# Patient Record
Sex: FEMALE | Race: WHITE | NOT HISPANIC OR LATINO | Employment: FULL TIME | URBAN - METROPOLITAN AREA
[De-identification: names, ages, dates, MRNs, and addresses within clinical notes are randomized per-mention and may not be internally consistent; named-entity substitution may affect disease eponyms.]

---

## 2016-07-06 LAB — PAP SMEAR: NORMAL

## 2017-03-07 DIAGNOSIS — B00.1 FEVER BLISTER: ICD-10-CM

## 2017-03-07 RX ORDER — ACYCLOVIR 50 MG/G
CREAM TOPICAL
Qty: 5 G | Refills: 3 | Status: SHIPPED | OUTPATIENT
Start: 2017-03-07 | End: 2018-03-09 | Stop reason: SDUPTHER

## 2017-03-07 NOTE — TELEPHONE ENCOUNTER
----- Message from Wendy Mccord sent at 3/7/2017 11:53 AM CST -----  Contact: self  Patient wants to speak with a nurse regarding a refill on fever blister medication. Please call back at [phone]

## 2017-11-01 ENCOUNTER — OFFICE VISIT (OUTPATIENT)
Dept: FAMILY MEDICINE | Facility: CLINIC | Age: 52
End: 2017-11-01
Payer: COMMERCIAL

## 2017-11-01 VITALS
HEART RATE: 81 BPM | TEMPERATURE: 98 F | RESPIRATION RATE: 18 BRPM | SYSTOLIC BLOOD PRESSURE: 131 MMHG | DIASTOLIC BLOOD PRESSURE: 77 MMHG | WEIGHT: 181.88 LBS | OXYGEN SATURATION: 98 % | BODY MASS INDEX: 32.23 KG/M2 | HEIGHT: 63 IN

## 2017-11-01 DIAGNOSIS — J32.0 MAXILLARY SINUSITIS, UNSPECIFIED CHRONICITY: Primary | ICD-10-CM

## 2017-11-01 DIAGNOSIS — R06.02 SOB (SHORTNESS OF BREATH): ICD-10-CM

## 2017-11-01 PROCEDURE — 99999 PR PBB SHADOW E&M-EST. PATIENT-LVL III: CPT | Mod: PBBFAC,,, | Performed by: FAMILY MEDICINE

## 2017-11-01 PROCEDURE — 99214 OFFICE O/P EST MOD 30 MIN: CPT | Mod: 25,S$GLB,, | Performed by: FAMILY MEDICINE

## 2017-11-01 PROCEDURE — 96372 THER/PROPH/DIAG INJ SC/IM: CPT | Mod: S$GLB,,, | Performed by: FAMILY MEDICINE

## 2017-11-01 RX ORDER — AMOXICILLIN AND CLAVULANATE POTASSIUM 875; 125 MG/1; MG/1
1 TABLET, FILM COATED ORAL 2 TIMES DAILY
Qty: 20 TABLET | Refills: 0 | Status: SHIPPED | OUTPATIENT
Start: 2017-11-01 | End: 2017-11-11

## 2017-11-01 RX ORDER — BETAMETHASONE SODIUM PHOSPHATE AND BETAMETHASONE ACETATE 3; 3 MG/ML; MG/ML
9 INJECTION, SUSPENSION INTRA-ARTICULAR; INTRALESIONAL; INTRAMUSCULAR; SOFT TISSUE
Status: COMPLETED | OUTPATIENT
Start: 2017-11-01 | End: 2017-11-01

## 2017-11-01 RX ORDER — ALBUTEROL SULFATE 90 UG/1
2 AEROSOL, METERED RESPIRATORY (INHALATION) 4 TIMES DAILY
Qty: 1 INHALER | Refills: 1 | Status: SHIPPED | OUTPATIENT
Start: 2017-11-01 | End: 2019-03-04 | Stop reason: SDUPTHER

## 2017-11-01 RX ADMIN — BETAMETHASONE SODIUM PHOSPHATE AND BETAMETHASONE ACETATE 9 MG: 3; 3 INJECTION, SUSPENSION INTRA-ARTICULAR; INTRALESIONAL; INTRAMUSCULAR; SOFT TISSUE at 10:11

## 2017-11-01 NOTE — NURSING
2 Patient identifiers used (name & ). Administered 9 mg Celestone IM. Patient tolerated well. No bleeding at insertion site noted. Pain scale 0/10. Allergies reviewed. Aseptic technique maintained.

## 2017-11-01 NOTE — PROGRESS NOTES
Subjective:       Patient ID: Candie Elaine is a 52 y.o. female.    Chief Complaint: URI    Cough   This is a new problem. The current episode started in the past 7 days. The problem has been gradually worsening. The problem occurs hourly. The cough is non-productive. Associated symptoms include ear congestion, nasal congestion, postnasal drip, shortness of breath and wheezing. Pertinent negatives include no chest pain, fever or rash. Associated symptoms comments: Sinus painful.     Review of Systems   Constitutional: Negative for fever.   HENT: Positive for postnasal drip.    Respiratory: Positive for cough, shortness of breath and wheezing.    Cardiovascular: Negative for chest pain.   Gastrointestinal: Negative for abdominal pain and nausea.   Skin: Negative for rash.   All other systems reviewed and are negative.      Objective:      Physical Exam   Constitutional: She appears well-developed. No distress.   HENT:   Right Ear: Tympanic membrane is not erythematous.   Left Ear: Tympanic membrane is not erythematous.   Nose: Mucosal edema present. Right sinus exhibits maxillary sinus tenderness. Left sinus exhibits maxillary sinus tenderness.   Mouth/Throat: Posterior oropharyngeal erythema present.   Neck: Neck supple.   Cardiovascular: Normal rate and regular rhythm.    No murmur heard.  Pulmonary/Chest: Effort normal and breath sounds normal.   Lymphadenopathy:     She has no cervical adenopathy.       Assessment:       1. Maxillary sinusitis, unspecified chronicity    2. SOB (shortness of breath)        Plan:         Candie was seen today for uri.    Diagnoses and all orders for this visit:    Maxillary sinusitis, unspecified chronicity    SOB (shortness of breath)    Other orders  -     betamethasone acetate-betamethasone sodium phosphate injection 9 mg; Inject 1.5 mLs (9 mg total) into the muscle one time.  -     albuterol 90 mcg/actuation inhaler; Inhale 2 puffs into the lungs 4 (four) times daily.  -      amoxicillin-clavulanate 875-125mg (AUGMENTIN) 875-125 mg per tablet; Take 1 tablet by mouth 2 (two) times daily. Take after meals.

## 2018-03-09 DIAGNOSIS — B00.1 FEVER BLISTER: ICD-10-CM

## 2018-03-09 RX ORDER — ACYCLOVIR 50 MG/G
CREAM TOPICAL
Qty: 5 G | Refills: 0 | Status: SHIPPED | OUTPATIENT
Start: 2018-03-09 | End: 2018-03-13 | Stop reason: SDUPTHER

## 2018-03-09 NOTE — TELEPHONE ENCOUNTER
----- Message from Ruthie Andre sent at 3/9/2018  3:22 PM CST -----  Contact: self  Type:  RX Refill Request    Who Called:  self  Refill or New Rx:  refill  RX Name and Strength: acyclovir 5% (ZOVIRAX) 5 % Cream  How is the patient currently taking it? (ex. 1XDay):    Is this a 30 day or 90 day RX:  30  [facility]  Local or Mail Order:  local  Ordering Provider:    Best Call Back Number:  [phone]  Additional Information:      [facility] - [place], [place] - [address]  [address]  [place] [address]  Phone: [phone] Fax: [phone]

## 2018-03-13 DIAGNOSIS — B00.1 FEVER BLISTER: ICD-10-CM

## 2018-03-13 RX ORDER — ACYCLOVIR 50 MG/G
CREAM TOPICAL
Qty: 5 G | Refills: 0 | Status: SHIPPED | OUTPATIENT
Start: 2018-03-13 | End: 2018-12-17 | Stop reason: SDUPTHER

## 2018-03-13 NOTE — TELEPHONE ENCOUNTER
----- Message from Aviva Story sent at 3/13/2018  1:57 PM CDT -----  Rx Zophamax for fever blisters.  [facility] [facility].   Please call patient at [phone] when called in.

## 2018-08-27 ENCOUNTER — TELEPHONE (OUTPATIENT)
Dept: FAMILY MEDICINE | Facility: CLINIC | Age: 53
End: 2018-08-27

## 2018-08-27 NOTE — TELEPHONE ENCOUNTER
----- Message from Nelson Khanna sent at 8/27/2018 10:35 AM CDT -----  Type:  Patient Returning Call    Who Called:  Patient  Who Left Message for Patient:  Bridgette  Does the patient know what this is regarding?:  Appointment  Best Call Back Number:[phone]  Additional Information:

## 2018-08-27 NOTE — TELEPHONE ENCOUNTER
----- Message from Zeke Mcknight sent at 8/27/2018  9:14 AM CDT -----  Contact: pt  Pt is calling to see if she can be seen today(hand wrist and arm is swollen(stung by bee)  Call Back#[phone]  Thanks

## 2018-11-09 ENCOUNTER — TELEPHONE (OUTPATIENT)
Dept: ADMINISTRATIVE | Facility: HOSPITAL | Age: 53
End: 2018-11-09

## 2018-11-09 NOTE — LETTER
November 9, 2018    DR. HAWKINS             [facility]  [address]  [facility] [address]  Phone: [phone] November 9, 2018     Patient: Candie Elaine    YOB: 1965   Date of Visit: 11/9/2018       To Whom It May Concern:      [facility]    We are seeing Candie Elaine in the clinic today at [facility].  Garfield Gavin MD is their PCP.  She/He has an outstanding lab/procedure at this time when reviewing their chart.  To help with our Health Maintenance records will you please supply the following:      [x]  Mammogram                                                []  Colonoscopy   [x]  Pap Smear                                                  []  Outside Lab Results   []  Dexa scans                                                   []  Eye Exam   []  Foot Exam                                                     [] Other___________   []  Outside Immunizations                                               Please Fax to [facility] at [phone]          If you have any questions or concerns, please don't hesitate to call.    Sincerely,        Garfield Gavin MD

## 2018-12-17 DIAGNOSIS — B00.1 FEVER BLISTER: ICD-10-CM

## 2018-12-17 RX ORDER — ACYCLOVIR 50 MG/G
CREAM TOPICAL
Qty: 5 G | Refills: 0 | Status: SHIPPED | OUTPATIENT
Start: 2018-12-17 | End: 2018-12-18 | Stop reason: CLARIF

## 2018-12-17 NOTE — TELEPHONE ENCOUNTER
----- Message from Nuvia Bhatia sent at 12/17/2018  9:10 AM CST -----  Type:  RX Refill Request    Who Called:  Patient  RX Name and Strength:  acyclovir 5% (ZOVIRAX) 5 % Cream  Preferred Pharmacy with phone number:  [facility] [place] at [phone] (Phone)  Best Call Back Number: [phone]  Additional Information:

## 2018-12-18 RX ORDER — ACYCLOVIR 50 MG/G
OINTMENT TOPICAL
Qty: 15 G | Refills: 3 | Status: SHIPPED | OUTPATIENT
Start: 2018-12-18 | End: 2022-01-10

## 2019-02-22 ENCOUNTER — TELEPHONE (OUTPATIENT)
Dept: FAMILY MEDICINE | Facility: CLINIC | Age: 54
End: 2019-02-22

## 2019-02-22 NOTE — TELEPHONE ENCOUNTER
Patient states she picked up the Zovirax ointment from pharmacy today and paid cash pay.  Insurance company is to send paperwork to be completed so that she can get reimbursed.

## 2019-02-22 NOTE — TELEPHONE ENCOUNTER
----- Message from Thao Humphreys sent at 2/22/2019  2:13 PM CST -----  Contact: self   Patient need to speak with a nurse today regarding rx acyclovir 5% (ZOVIRAX) 5 % ointment, she has been trying to get this rx since December thru [employer] to get approved. Please call back at [phone] to discuss

## 2019-02-28 ENCOUNTER — PATIENT MESSAGE (OUTPATIENT)
Dept: FAMILY MEDICINE | Facility: CLINIC | Age: 54
End: 2019-02-28

## 2019-03-04 ENCOUNTER — OFFICE VISIT (OUTPATIENT)
Dept: FAMILY MEDICINE | Facility: CLINIC | Age: 54
End: 2019-03-04
Payer: COMMERCIAL

## 2019-03-04 DIAGNOSIS — R05.9 COUGH: ICD-10-CM

## 2019-03-04 DIAGNOSIS — R50.9 FEVER, UNSPECIFIED FEVER CAUSE: ICD-10-CM

## 2019-03-04 DIAGNOSIS — J10.1 INFLUENZA A: ICD-10-CM

## 2019-03-04 DIAGNOSIS — R52 BODY ACHES: ICD-10-CM

## 2019-03-04 DIAGNOSIS — J02.9 SORE THROAT: Primary | ICD-10-CM

## 2019-03-04 LAB
INFLUENZA A, MOLECULAR: POSITIVE
INFLUENZA B, MOLECULAR: NEGATIVE
SPECIMEN SOURCE: ABNORMAL

## 2019-03-04 PROCEDURE — 99213 OFFICE O/P EST LOW 20 MIN: CPT | Mod: S$GLB,,, | Performed by: NURSE PRACTITIONER

## 2019-03-04 PROCEDURE — 87502 INFLUENZA DNA AMP PROBE: CPT | Mod: PO

## 2019-03-04 PROCEDURE — 99999 PR PBB SHADOW E&M-EST. PATIENT-LVL II: CPT | Mod: PBBFAC,,, | Performed by: NURSE PRACTITIONER

## 2019-03-04 PROCEDURE — 99999 PR PBB SHADOW E&M-EST. PATIENT-LVL II: ICD-10-PCS | Mod: PBBFAC,,, | Performed by: NURSE PRACTITIONER

## 2019-03-04 PROCEDURE — 99213 PR OFFICE/OUTPT VISIT, EST, LEVL III, 20-29 MIN: ICD-10-PCS | Mod: S$GLB,,, | Performed by: NURSE PRACTITIONER

## 2019-03-04 RX ORDER — OSELTAMIVIR PHOSPHATE 75 MG/1
75 CAPSULE ORAL 2 TIMES DAILY
Qty: 7 CAPSULE | Refills: 0 | Status: SHIPPED | OUTPATIENT
Start: 2019-03-04 | End: 2019-03-09

## 2019-03-04 RX ORDER — ALBUTEROL SULFATE 90 UG/1
2 AEROSOL, METERED RESPIRATORY (INHALATION) 4 TIMES DAILY
Qty: 1 INHALER | Refills: 1 | Status: SHIPPED | OUTPATIENT
Start: 2019-03-04 | End: 2019-06-25 | Stop reason: SDUPTHER

## 2019-03-04 RX ORDER — ALBUTEROL SULFATE 90 UG/1
2 AEROSOL, METERED RESPIRATORY (INHALATION) 4 TIMES DAILY
Qty: 1 INHALER | Refills: 1 | Status: SHIPPED | OUTPATIENT
Start: 2019-03-04 | End: 2019-03-04 | Stop reason: SDUPTHER

## 2019-03-04 NOTE — TELEPHONE ENCOUNTER
----- Message from Allen Santiago sent at 3/4/2019  3:58 PM CST -----  Type:  Patient Call Back    Who Called:  pt  Does the patient k now what this is regarding?: office sent albuterol (PROVENTIL/VENTOLIN HFA) 90 mcg/actuation inhaler to the wrong pharmacy. Supposed to go to    [facility]  [place], [place] - [address]  [address]  [place] [address]  Phone: [phone] Fax: [phone]      Best Call Back Number:  [phone]  Additional Information:  Please call pt and leave a detailed message if there is no answer.

## 2019-03-04 NOTE — PROGRESS NOTES
Subjective:       Patient ID: Candie Elaine is a 53 y.o. female.    Chief Complaint: No chief complaint on file.    HPI   Ms. Elaine is a 54 yo female who presents today with c/o cough, sore throat, PND.  This began on Saturday.  She states she has subjective fever, chills and body aches.  She has been taking aleve for the symptoms, and has done chloraseptic.  She did not get her flu shot this season.  She denies sick contacts.    Review of Systems   Constitutional: Positive for chills, fatigue and fever.   HENT: Positive for postnasal drip and sore throat.    Eyes: Negative for pain, discharge and itching.   Respiratory: Positive for cough.    Gastrointestinal: Negative for diarrhea, nausea and vomiting.   Genitourinary: Negative for dysuria, flank pain and frequency.   Musculoskeletal: Positive for myalgias.   Skin: Negative for color change, pallor and rash.   Neurological: Positive for headaches. Negative for dizziness and light-headedness.       Objective:      Physical Exam   Constitutional: She is oriented to person, place, and time. She appears well-developed and well-nourished. She appears ill.   HENT:   Head: Normocephalic and atraumatic.   Right Ear: Hearing normal. No middle ear effusion.   Left Ear: Hearing normal.  No middle ear effusion.   Nose: Mucosal edema present. Right sinus exhibits no maxillary sinus tenderness and no frontal sinus tenderness. Left sinus exhibits no maxillary sinus tenderness and no frontal sinus tenderness.   Mouth/Throat: Posterior oropharyngeal erythema (mild) present.   Eyes: Conjunctivae are normal. Pupils are equal, round, and reactive to light.   Cardiovascular: Normal rate, regular rhythm, S1 normal, S2 normal and normal heart sounds.   Pulmonary/Chest: Effort normal and breath sounds normal. No stridor. She has no wheezes. She has no rales.   Lymphadenopathy:     She has no cervical adenopathy.   Neurological: She is alert and oriented to person, place, and time.    Skin: Skin is warm, dry and intact.       Assessment:       1. Sore throat    2. Cough    3. Body aches    4. Fever, unspecified fever cause    5. Influenza A        Plan:       Sore throat  -     POCT Rapid Strep A        - see below  Cough  -     Influenza A & B by Molecular  -     albuterol (PROVENTIL/VENTOLIN HFA) 90 mcg/actuation inhaler; Inhale 2 puffs into the lungs 4 (four) times daily.  Dispense: 1 Inhaler; Refill: 1         Body aches  -     Influenza A & B by Molecular        - see below  Fever, unspecified fever cause  -     Influenza A & B by Molecular        - see below   Influenza A  -     oseltamivir (TAMIFLU) 75 MG capsule; Take 1 capsule (75 mg total) by mouth 2 (two) times daily. for 5 days  Dispense: 7 capsule; Refill: 0        -     Increase fluids, rest and vitamin C        -   Tylenol for pain and fever

## 2019-03-04 NOTE — TELEPHONE ENCOUNTER
Patient called to say the prescription was sent to the wrong pharmacy. Called the [facility] pharmacy and cancelled the prescription. Patient wants the prescription to go to the [facility] in [place], [place]. Can you please sign this?

## 2019-03-06 ENCOUNTER — TELEPHONE (OUTPATIENT)
Dept: FAMILY MEDICINE | Facility: CLINIC | Age: 54
End: 2019-03-06

## 2019-03-06 RX ORDER — OSELTAMIVIR PHOSPHATE 75 MG/1
75 CAPSULE ORAL 2 TIMES DAILY
Qty: 3 CAPSULE | Refills: 0 | Status: SHIPPED | OUTPATIENT
Start: 2019-03-06 | End: 2019-03-11

## 2019-03-06 NOTE — TELEPHONE ENCOUNTER
----- Message from Allen Santiago sent at 3/6/2019  8:49 AM CST -----  Type:  Patient Call Back     Who Called:  pt   Does the patient k now what this is regarding?: office sent albuterol (PROVENTIL/VENTOLIN HFA) 90 mcg/actuation inhaler to the wrong pharmacy. Supposed to go to     [facility]  [place] - [address]   [address]   [place] [address]   Phone: [phone] Fax: [phone]       Best Call Back Number:  [phone]   Additional Information:  Please call pt and leave a detailed message if there is no answer.

## 2019-03-06 NOTE — TELEPHONE ENCOUNTER
Left message advising patient that I spoke with the pharmacy in [place]. They put her proventil Rx on hold and all she needs to do is call the one in [place] and ask them to fill it. Number left in case she needs to call back.

## 2019-03-07 NOTE — TELEPHONE ENCOUNTER
----- Message from Shayy Marquez LPN sent at 3/6/2019  2:28 PM CST -----      ----- Message -----  From: Allen Santiago  Sent: 3/6/2019   8:49 AM  To: Ida Morel Staff      Who Called:  pt   Does the patient k now what this is regarding?:pt tamiflu dosage is wrong; the dosage calls for 10 pills and was only prescibed 7 pills.  office sent albuterol (PROVENTIL/VENTOLIN HFA) 90 mcg/actuation inhaler to the wrong pharmacy. Supposed to go to     [facility] - [place], [place] - [address]   [address]   [place] [address]   Phone: [phone] Fax: [phone]       Best Call Back Number:  [phone]   Additional Information:  Please call pt and leave a detailed message if there is no answer.

## 2019-03-08 ENCOUNTER — TELEPHONE (OUTPATIENT)
Dept: FAMILY MEDICINE | Facility: CLINIC | Age: 54
End: 2019-03-08

## 2019-03-08 NOTE — TELEPHONE ENCOUNTER
----- Message from RT Torsten sent at 3/8/2019  7:34 AM CST -----  Contact: pt    pt , this is her 5th attempt to get her tamaflu medication directions corrected, therefore, [facility] can fill the medication, please call to confirm,thanks.

## 2019-03-08 NOTE — TELEPHONE ENCOUNTER
Pt states that she was ordered Tamiflu and was only given 7 pills and the full rx requires 10 pills.  Contacted pharmacy and phoned in the additional 3 pills to complete Theraflu therapy.  Pt notified and voiced understanding.

## 2019-03-11 ENCOUNTER — TELEPHONE (OUTPATIENT)
Dept: FAMILY MEDICINE | Facility: CLINIC | Age: 54
End: 2019-03-11

## 2019-03-11 NOTE — TELEPHONE ENCOUNTER
I first spoke with Rosie at [facility] to find out what claims had been sent in from [facility] for patients Tamiflu.  Rosie stated on 3/4/19 a claim processed for .50 cents. Then on 3/6/19 a claim went thru for 8.78 but was reversed. On 3/8/19 the charge for 8.78 went thru again. Per [facility] the patient paid:   .50 +  8.78= 9.28    Patient is stating she was charged 15.00 twice. She is going to go home and check her receipts. If there is a discrepancy she was advised to take this up with [facility] but [facility] she was charged by them too much if that indeed is the case. Patient said she would look into it when she got home this evening.

## 2019-03-11 NOTE — TELEPHONE ENCOUNTER
----- Message from Lynsey Castrejon sent at 3/8/2019  2:51 PM CST -----  Contact: pt  Pt states that she went to [facility] to get the 3 pills that was sent over and the pharmacy charged her the full co-pay of 15.00 like they did on Monday when she got the 7 pills. Pt would like a call back from the office please to advise her . Had told  the pt to contact her insurance and let them know this information  ...[phone]

## 2019-06-25 DIAGNOSIS — R05.9 COUGH: ICD-10-CM

## 2019-06-25 RX ORDER — ALBUTEROL SULFATE 90 UG/1
2 AEROSOL, METERED RESPIRATORY (INHALATION) 4 TIMES DAILY
Qty: 3 INHALER | Refills: 1 | Status: SHIPPED | OUTPATIENT
Start: 2019-06-25 | End: 2022-09-07

## 2019-06-25 NOTE — TELEPHONE ENCOUNTER
----- Message from Sander Whipple sent at 6/25/2019 11:04 AM CDT -----  Contact: Patient  Type: Needs Medical Advice    Who Called:  Patient  Pharmacy name and phone #:    [facility] - [place] - [address]  [address]  [place] [address]  Phone: [phone] Fax: [phone]  Best Call Back Number: [phone]  Additional Information: Patient states that insurance company requests for a 90 supply of albuterol (PROVENTIL/VENTOLIN HFA) 90 mcg/actuation inhaler instead of current to keep paying for it. Please call to verify. Thanks!

## 2019-06-27 ENCOUNTER — TELEPHONE (OUTPATIENT)
Dept: FAMILY MEDICINE | Facility: CLINIC | Age: 54
End: 2019-06-27

## 2019-06-27 DIAGNOSIS — R05.9 COUGH: ICD-10-CM

## 2019-06-27 NOTE — TELEPHONE ENCOUNTER
----- Message from Stefanieluis manuel Fariabarrett sent at 6/27/2019 12:05 PM CDT -----  Contact: Self  Patient called in to let you know that her insurance will not pay for her albuterol (PROVENTIL/VENTOLIN HFA) 90 mcg/actuation inhaler unless it is a ninety day script. You sent it over yesterday but only for a 75 so they kicked it out again.  Please send a new script for 90 day supply to the following pharmacy and thank you.    [facility] - [place] - [address]  [address]  [place] [address]  Phone: [phone] Fax: [phone]

## 2019-06-27 NOTE — TELEPHONE ENCOUNTER
Advised pt that Rx sent in on 06/25/19 was for 90 day supply. Advised pt to contact pharmacy again to make sure. Understanding verbalized.

## 2019-06-27 NOTE — TELEPHONE ENCOUNTER
----- Message from Kandy Pineda sent at 6/27/2019  1:47 PM CDT -----  Contact: Patricia franco/ Tammie   Type:  Pharmacy Calling to Clarify an RX    Name of Caller:  Patricia  [facility]  Prescription Name:  albuterol (PROVENTIL/VENTOLIN HFA) 90 mcg/actuation inhaler  What do they need to clarify?:  Needs quantity and generic authorization  Best Call Back Number:  [phone]  Additional Information:  Pls call Patricia to adv

## 2019-07-30 ENCOUNTER — PATIENT OUTREACH (OUTPATIENT)
Dept: ADMINISTRATIVE | Facility: HOSPITAL | Age: 54
End: 2019-07-30

## 2019-09-06 ENCOUNTER — TELEPHONE (OUTPATIENT)
Dept: ADMINISTRATIVE | Facility: HOSPITAL | Age: 54
End: 2019-09-06

## 2019-09-07 ENCOUNTER — PATIENT OUTREACH (OUTPATIENT)
Dept: ADMINISTRATIVE | Facility: HOSPITAL | Age: 54
End: 2019-09-07

## 2019-10-03 NOTE — TELEPHONE ENCOUNTER
Acyclovir 5 gm not available-pharmacy asking to change to 15 gm-sent fax to pharmacy that was what was sent in.

## 2019-11-13 ENCOUNTER — LAB VISIT (OUTPATIENT)
Dept: LAB | Facility: HOSPITAL | Age: 54
End: 2019-11-13
Attending: FAMILY MEDICINE
Payer: COMMERCIAL

## 2019-11-13 DIAGNOSIS — Z12.11 ENCOUNTER FOR FIT (FECAL IMMUNOCHEMICAL TEST) SCREENING: ICD-10-CM

## 2019-11-13 PROCEDURE — 82274 ASSAY TEST FOR BLOOD FECAL: CPT

## 2019-11-21 ENCOUNTER — PATIENT OUTREACH (OUTPATIENT)
Dept: ADMINISTRATIVE | Facility: HOSPITAL | Age: 54
End: 2019-11-21

## 2019-11-21 NOTE — LETTER
November 21, 2019    Candie Elaine  [address]  [place] [address]             [facility]  [address]  [facility] [address]  Phone: [phone] Dear Wendy [facility] is committed to your overall health and would like to ensure that you are up to date on all of your health maintenance testing.  Our records indicate that you are overdue for your colorectal cancer screening.  After reviewing your chart, it has been documented that a FIT KIT (colorectal cancer screening kit) was given at a clinic visit or  mailed to you,  but the lab has yet to receive the kit so that we may update your chart.   This is a friendly reminder to complete this test (the instructions will tell you how) and then return your sample in the postage-paid return envelope within 24 hours of collection.  Please remember to put the collection date on your sample!    If your test results are negative, you won't need testing again for another year.  If results show you need more testing, we will call you with next steps.    Sincerely,      Garfield Gavin MD and your [facility]              Maggi ARAGON  Panel Care Coordinator  [facility] [address] [place] [address]  Phone [phone]  Fax [phone]

## 2019-11-21 NOTE — LETTER
November 21, 2019    Candie FERRO Argentina  [address]  [place] [address]             [facility]  [address]  [facility] [address]  Phone: [phone] Candie Elaine  [address] [place] [address]        Dear, Candie Elaine      [facility] to your overall health.  To help you get the most out of each of your visits, we will review your information to make sure you are up to date on all of your recommended tests and/or procedures.      Dr. Garfield Gavin MD  has found that your chart shows you may be due for       Health Maintenance Due   Topic Date Due    Hepatitis C Screening  1965    TETANUS VACCINE  11/01/1981    Lipid Panel  02/12/2013    Shingles Vaccine (1 of 2) 11/01/2013    Colonoscopy  11/01/2013    Mammogram  06/17/2017    Pap Smear with HPV Cotest  07/06/2019    Influenza Vaccine (1) 09/01/2019     If you have had any of the above done at another facility, please bring the records or information with you so that your record at [facility] will be complete.  If you would like to schedule any of these, please contact the clinic at [phone].    No future appointments.    Thank You,    Your [facility],  MD Maggi Cooper,  Panel Care Coordinator  [facility] [address] [place] [address]  Phone [phone]  Fax [phone]

## 2019-11-21 NOTE — LETTER
AUTHORIZATION FOR RELEASE OF   CONFIDENTIAL INFORMATION    [facility]    We are seeing Candie Elaine, date of birth 1965, in the clinic at [facility]. Garfield Gavin MD is the patient's PCP. Candie Elaine has an outstanding lab/procedure at the time we reviewed her chart. In order to help keep her health information updated, she has authorized us to request the following medical record(s):        ( x)  MAMMOGRAM                                      (  )  COLONOSCOPY          (  )  PAP SMEAR                                          (  )  OUTSIDE LAB RESULTS     (  )  DEXA SCAN                                          (  )  EYE EXAM            (  )  FOOT EXAM                                          (  )  ENTIRE RECORD     (  )  OUTSIDE IMMUNIZATIONS                 (  )  _______________     [facility] [phone]    Thank you in advance,      Maggi ARAGON  Panel Care Coordinator  [facility] [address] [place] [address]  Phone [phone]  Fax [phone]          Patient Name: Candie Elaine  : 1965  Patient Phone #: [phone]

## 2019-11-22 ENCOUNTER — TELEPHONE (OUTPATIENT)
Dept: FAMILY MEDICINE | Facility: CLINIC | Age: 54
End: 2019-11-22

## 2019-11-22 ENCOUNTER — PATIENT OUTREACH (OUTPATIENT)
Dept: ADMINISTRATIVE | Facility: HOSPITAL | Age: 54
End: 2019-11-22

## 2019-11-22 DIAGNOSIS — Z12.11 ENCOUNTER FOR FIT (FECAL IMMUNOCHEMICAL TEST) SCREENING: Primary | ICD-10-CM

## 2019-11-22 NOTE — TELEPHONE ENCOUNTER
This patient has not seen Dr. Gavin since 2013. She has been in the clinic but none of her health maintenance has been addressed. She is past due for many items. She needs to be scheduled with someone for an annual check up, needs labs, colonscopy or Fit Kit.   Thanks.  Adriana

## 2019-11-27 LAB — HEMOCCULT STL QL IA: NEGATIVE

## 2022-01-10 ENCOUNTER — OFFICE VISIT (OUTPATIENT)
Dept: FAMILY MEDICINE | Facility: CLINIC | Age: 57
End: 2022-01-10
Payer: COMMERCIAL

## 2022-01-10 ENCOUNTER — HOSPITAL ENCOUNTER (OUTPATIENT)
Dept: RADIOLOGY | Facility: CLINIC | Age: 57
Discharge: HOME | End: 2022-01-10
Attending: FAMILY MEDICINE
Payer: COMMERCIAL

## 2022-01-10 VITALS
WEIGHT: 141 LBS | OXYGEN SATURATION: 98 % | SYSTOLIC BLOOD PRESSURE: 112 MMHG | TEMPERATURE: 98 F | RESPIRATION RATE: 16 BRPM | DIASTOLIC BLOOD PRESSURE: 70 MMHG | BODY MASS INDEX: 24.98 KG/M2 | HEART RATE: 87 BPM | HEIGHT: 63 IN

## 2022-01-10 DIAGNOSIS — M21.611 BUNION OF RIGHT FOOT: ICD-10-CM

## 2022-01-10 DIAGNOSIS — M21.611 BUNION OF RIGHT FOOT: Primary | ICD-10-CM

## 2022-01-10 DIAGNOSIS — J45.909 ASTHMA DUE TO ENVIRONMENTAL ALLERGIES: ICD-10-CM

## 2022-01-10 PROCEDURE — 1159F PR MEDICATION LIST DOCUMENTED IN MEDICAL RECORD: ICD-10-PCS | Mod: S$GLB,,, | Performed by: FAMILY MEDICINE

## 2022-01-10 PROCEDURE — 1159F MED LIST DOCD IN RCRD: CPT | Mod: S$GLB,,, | Performed by: FAMILY MEDICINE

## 2022-01-10 PROCEDURE — 73630 X-RAY EXAM OF FOOT: CPT | Mod: 26,RT,, | Performed by: RADIOLOGY

## 2022-01-10 PROCEDURE — 1160F RVW MEDS BY RX/DR IN RCRD: CPT | Mod: S$GLB,,, | Performed by: FAMILY MEDICINE

## 2022-01-10 PROCEDURE — 3078F PR MOST RECENT DIASTOLIC BLOOD PRESSURE < 80 MM HG: ICD-10-PCS | Mod: S$GLB,,, | Performed by: FAMILY MEDICINE

## 2022-01-10 PROCEDURE — 3008F PR BODY MASS INDEX (BMI) DOCUMENTED: ICD-10-PCS | Mod: S$GLB,,, | Performed by: FAMILY MEDICINE

## 2022-01-10 PROCEDURE — 1160F PR REVIEW ALL MEDS BY PRESCRIBER/CLIN PHARMACIST DOCUMENTED: ICD-10-PCS | Mod: S$GLB,,, | Performed by: FAMILY MEDICINE

## 2022-01-10 PROCEDURE — 99213 OFFICE O/P EST LOW 20 MIN: CPT | Mod: S$GLB,,, | Performed by: FAMILY MEDICINE

## 2022-01-10 PROCEDURE — 3078F DIAST BP <80 MM HG: CPT | Mod: S$GLB,,, | Performed by: FAMILY MEDICINE

## 2022-01-10 PROCEDURE — 3074F SYST BP LT 130 MM HG: CPT | Mod: S$GLB,,, | Performed by: FAMILY MEDICINE

## 2022-01-10 PROCEDURE — 73630 XR FOOT COMPLETE 3 VIEW RIGHT: ICD-10-PCS | Mod: 26,RT,, | Performed by: RADIOLOGY

## 2022-01-10 PROCEDURE — 73630 XR FOOT COMPLETE 3 VIEW RIGHT: ICD-10-PCS | Mod: TC,RT,, | Performed by: FAMILY MEDICINE

## 2022-01-10 PROCEDURE — 3074F PR MOST RECENT SYSTOLIC BLOOD PRESSURE < 130 MM HG: ICD-10-PCS | Mod: S$GLB,,, | Performed by: FAMILY MEDICINE

## 2022-01-10 PROCEDURE — 73630 X-RAY EXAM OF FOOT: CPT | Mod: TC,RT,, | Performed by: FAMILY MEDICINE

## 2022-01-10 PROCEDURE — 3008F BODY MASS INDEX DOCD: CPT | Mod: S$GLB,,, | Performed by: FAMILY MEDICINE

## 2022-01-10 PROCEDURE — 99213 PR OFFICE/OUTPT VISIT, EST, LEVL III, 20-29 MIN: ICD-10-PCS | Mod: S$GLB,,, | Performed by: FAMILY MEDICINE

## 2022-01-10 NOTE — LETTER
January 10, 2022    Candie Elaine  [address] [place] [address]         [place] [id number] [place] [id number]  Phone: [phone] January 10, 2022     Patient: Candie Elaine   YOB: 1965   Date of Visit: 1/10/2022       To Whom It May Concern:    It is my medical opinion that Candie Elaine     Due to patient's asthma patient may need ore frequent breaks, as well as may need to carry her inhaler with her while at work. Please allow patient to rest when needed to catch her breath.     If you have any questions or concerns, please don't hesitate to call.    Sincerely,     Martha Renee, NP

## 2022-01-10 NOTE — PROGRESS NOTES
Subjective:       Patient ID: Candie Elaine is a 56 y.o. female.    Chief Complaint: Referral (Podiatry referral - bonion on right foot since  2019 ), ear popping (Pt reports that both ears have been popping since today. ), and Letter for School/Work (Mask exemption letter due to having to use inhaler 2-3 times a day. /)    This patient is new to me and new to the clinic.  Candie Elaine presents to the clinic today to establish care and get a podiatry referral. Patient reports she previously worked part time and did not have insurance but now that she works full time she has insurance again and is taking care of herself. Patient states she has a bunion on her right foot causing her pain and needs to see podiatry for this.   Patient also states her ears have been popping which started today. Patient works as a  and feels her allergies and asthma are worse with having to wear the mask again at work. Patient is asking for a letter for work to excuse her from wearing a mask while at work due to this. Patient states since the mask mandate is back she feels she is short of breath and has to use her inhaler more.  Patient educated on plan of care, verbalized understanding.     Review of Systems   Constitutional: Negative for activity change, appetite change, chills, diaphoresis and fever.   HENT: Negative for congestion, ear pain, postnasal drip, sinus pressure, sneezing and sore throat.    Eyes: Negative for pain, discharge, redness and itching.   Respiratory: Negative for apnea, cough, chest tightness, shortness of breath and wheezing.    Cardiovascular: Negative for chest pain and leg swelling.   Gastrointestinal: Negative for abdominal distention, abdominal pain, constipation, diarrhea, nausea and vomiting.   Genitourinary: Negative for difficulty urinating, dysuria, flank pain and frequency.   Musculoskeletal: Positive for gait problem. Back pain: foot pain.   Skin: Negative for color change, rash  and wound.   Neurological: Negative for dizziness.       Patient Active Problem List   Diagnosis    Chronic back pain    Shoulder bursitis    Obesity, Class I, BMI 30-34.9    BMI 31.0-31.9,adult    Asthma due to environmental allergies    Bunion of right foot       Objective:      Physical Exam  Vitals reviewed.   Constitutional:       General: She is not in acute distress.     Appearance: Normal appearance. She is well-developed.   HENT:      Head: Normocephalic.      Right Ear: Ear canal and external ear normal. There is no impacted cerumen. Tympanic membrane is bulging.      Left Ear: Ear canal and external ear normal. There is no impacted cerumen. Tympanic membrane is bulging.      Nose: Nose normal.   Eyes:      Conjunctiva/sclera: Conjunctivae normal.      Pupils: Pupils are equal, round, and reactive to light.   Cardiovascular:      Rate and Rhythm: Normal rate and regular rhythm.      Heart sounds: Normal heart sounds.   Pulmonary:      Effort: Pulmonary effort is normal. No respiratory distress.      Breath sounds: Normal breath sounds.   Abdominal:      General: There is no distension.      Palpations: Abdomen is soft.      Tenderness: There is no abdominal tenderness.   Musculoskeletal:      Cervical back: Normal range of motion and neck supple.   Skin:     General: Skin is warm and dry.      Findings: No rash.   Neurological:      Mental Status: She is alert and oriented to person, place, and time.   Psychiatric:         Mood and Affect: Mood normal.         Behavior: Behavior normal.         Lab Results   Component Value Date    WBC 7.10 09/02/2009    HGB 13.2 09/02/2009    HCT 39.4 09/02/2009     (L) 09/02/2009    CHOL 159 02/12/2008    TRIG 73 02/12/2008    HDL 45 02/12/2008    ALT 17 09/02/2009    AST 17 09/02/2009     02/12/2008    K 4.2 02/12/2008     02/12/2008    CREATININE 0.8 02/12/2008    BUN 9 02/12/2008    CO2 24 02/12/2008    INR 1.0 09/02/2009     The ASCVD Risk  "score (Wily TIMMONS Jr., et al., 2013) failed to calculate for the following reasons:    Cannot find a previous HDL lab    Cannot find a previous total cholesterol lab  Visit Vitals  /70 (BP Location: Right arm, Patient Position: Sitting, BP Method: Medium (Manual))   Pulse 87   Temp 98.3 °F (36.8 °C) (Oral)   Resp 16   Ht 5' 3" (1.6 m)   Wt 64 kg (141 lb)   LMP 09/05/2015 (Exact Date)   SpO2 98%   BMI 24.98 kg/m²      Assessment:       1. Bunion of right foot    2. Asthma due to environmental allergies    3. BMI 24.0-24.9, adult        Plan:       Candie was seen today for referral, ear popping and letter for school/work.    Diagnoses and all orders for this visit:    Bunion of right foot  -     Ambulatory referral/consult to Podiatry; Future  -     X-Ray Foot Complete Right; Future    Asthma due to environmental allergies  Continue medications as prescribed.  Follow up with PCP    BMI 24.0-24.9, adult  Body mass index is 24.98 kg/m².  Continue healthy diet and regular exercise as tolerated.  Continue medications as prescribed.  Follow up with PCP     Follow up if symptoms worsen or fail to improve.      Future Appointments     Date Provider Specialty Appt Notes    1/12/2022 Cher Johnston DPM Podiatry bunion right foot           "

## 2022-01-11 DIAGNOSIS — Z12.31 ENCOUNTER FOR SCREENING MAMMOGRAM FOR MALIGNANT NEOPLASM OF BREAST: Primary | ICD-10-CM

## 2022-01-11 NOTE — PATIENT INSTRUCTIONS
Bunion    You have a bunion. This is a bony bump at the base of your big toe, along the inside edge of your foot. As the bump gets bigger, it can become red, swollen, and painful with shoe wear.  Bunions may occur if you wear shoes that are too tight and pinch your toes together. High heels may make this worse. In some cases a bunion is due to poor alignment of the foot and ankle. This puts extra weight on the instep of each foot.  Once a bunion forms, it changes the way weight is spread all across your foot. This causes the bunion to get worse over time. The big toe will bend more and more toward the other toes.  A minor bunion can be treated by:  · Wearing properly fitting shoes  · Using bunion pads  · Wearing shoe inserts, called orthotics, to better align the foot and ankle  Physical therapy with ultrasound or whirlpool baths can ease pain, redness, and swelling. Severe cases may require surgery. If you dont treat what is causing the bunion, it may get larger and more painful.  Home care  · Limit high heels. These shoes force your foot forward, crowding the toes together.  · Switch to comfortable shoes with a wide toe area. Or have your existing shoes stretched by a shoe repair shop.  · Avoid shoes that are tight, narrow, or pointed.  · If you are flat-footed, using arch supports may help prevent further deformity. The best shoe inserts are the ones custom made by a foot specialist, called a podiatrist, or other healthcare provider.  · Put a bunion pad over the bunion to ease pressure of your shoe against the bunion. You can buy these pads at most pharmacies without a prescription  · To reduce pain and swelling, apply an ice pack over the injured area for 15 to 20 minutes. Do this every 1 to 2 hours the first day. Keep using ice 3 to 4 times a day until the pain and swelling goes away.  · To make an ice pack, put ice cubes in a sealed zip-lock plastic bag. Wrap the bag in a clean, thin towel or cloth. Never put  ice or an ice pack directly on the skin.  · You may use over-the-counter pain medicine to control pain, unless another medicine was prescribed. Talk with your provider before using these medicines if you have chronic liver or kidney disease, or ever had a stomach ulcer or GI (gastrointestinal) bleeding.  Follow-up care  Follow up with a podiatrist or foot doctor, or as advised.  If X-rays were taken, you will be notified of any new findings that may affect your care.  When to seek medical care  Contact your healthcare provider if any of the following occur:  · Increasing pain or redness around the base of the big toe  · Painful ingrown toenail, with redness and swelling or pus around the nail  Date Last Reviewed: 11/21/2015 © 2000-2017 [employer]. [address] [place] [address], [place] [address]. All rights reserved. This information is not intended as a substitute for professional medical care. Always follow your healthcare professional's instructions.

## 2022-01-12 ENCOUNTER — OFFICE VISIT (OUTPATIENT)
Dept: PODIATRY | Facility: CLINIC | Age: 57
End: 2022-01-12
Payer: COMMERCIAL

## 2022-01-12 ENCOUNTER — TELEPHONE (OUTPATIENT)
Dept: FAMILY MEDICINE | Facility: CLINIC | Age: 57
End: 2022-01-12
Payer: COMMERCIAL

## 2022-01-12 VITALS
RESPIRATION RATE: 18 BRPM | HEART RATE: 82 BPM | BODY MASS INDEX: 24.8 KG/M2 | HEIGHT: 63 IN | DIASTOLIC BLOOD PRESSURE: 68 MMHG | WEIGHT: 140 LBS | SYSTOLIC BLOOD PRESSURE: 114 MMHG | OXYGEN SATURATION: 99 %

## 2022-01-12 DIAGNOSIS — M79.671 RIGHT FOOT PAIN: ICD-10-CM

## 2022-01-12 DIAGNOSIS — M21.611 BUNION OF RIGHT FOOT: ICD-10-CM

## 2022-01-12 DIAGNOSIS — M20.11 HALLUX VALGUS OF RIGHT FOOT: Primary | ICD-10-CM

## 2022-01-12 DIAGNOSIS — M20.5X1 HALLUX LIMITUS OF RIGHT FOOT: ICD-10-CM

## 2022-01-12 PROCEDURE — 3008F BODY MASS INDEX DOCD: CPT | Mod: CPTII,S$GLB,, | Performed by: PODIATRIST

## 2022-01-12 PROCEDURE — 3074F SYST BP LT 130 MM HG: CPT | Mod: CPTII,S$GLB,, | Performed by: PODIATRIST

## 2022-01-12 PROCEDURE — 99204 OFFICE O/P NEW MOD 45 MIN: CPT | Mod: S$GLB,,, | Performed by: PODIATRIST

## 2022-01-12 PROCEDURE — 3078F PR MOST RECENT DIASTOLIC BLOOD PRESSURE < 80 MM HG: ICD-10-PCS | Mod: CPTII,S$GLB,, | Performed by: PODIATRIST

## 2022-01-12 PROCEDURE — 3078F DIAST BP <80 MM HG: CPT | Mod: CPTII,S$GLB,, | Performed by: PODIATRIST

## 2022-01-12 PROCEDURE — 3074F PR MOST RECENT SYSTOLIC BLOOD PRESSURE < 130 MM HG: ICD-10-PCS | Mod: CPTII,S$GLB,, | Performed by: PODIATRIST

## 2022-01-12 PROCEDURE — 99204 PR OFFICE/OUTPT VISIT, NEW, LEVL IV, 45-59 MIN: ICD-10-PCS | Mod: S$GLB,,, | Performed by: PODIATRIST

## 2022-01-12 PROCEDURE — 1160F PR REVIEW ALL MEDS BY PRESCRIBER/CLIN PHARMACIST DOCUMENTED: ICD-10-PCS | Mod: CPTII,S$GLB,, | Performed by: PODIATRIST

## 2022-01-12 PROCEDURE — 1160F RVW MEDS BY RX/DR IN RCRD: CPT | Mod: CPTII,S$GLB,, | Performed by: PODIATRIST

## 2022-01-12 PROCEDURE — 1159F MED LIST DOCD IN RCRD: CPT | Mod: CPTII,S$GLB,, | Performed by: PODIATRIST

## 2022-01-12 PROCEDURE — 3008F PR BODY MASS INDEX (BMI) DOCUMENTED: ICD-10-PCS | Mod: CPTII,S$GLB,, | Performed by: PODIATRIST

## 2022-01-12 PROCEDURE — 1159F PR MEDICATION LIST DOCUMENTED IN MEDICAL RECORD: ICD-10-PCS | Mod: CPTII,S$GLB,, | Performed by: PODIATRIST

## 2022-01-12 NOTE — TELEPHONE ENCOUNTER
----- Message from Bhumika Holbrook sent at 1/12/2022  3:58 PM CST -----  Patient call and said she need a revive work note pls. Call her this number [phone].

## 2022-01-12 NOTE — PROGRESS NOTES
"  [address]  [place] [address]  Phone: [phone]   [phone]    Patient's PCP:Primary Doctor No  Referring Provider: Martha Renee    Subjective:      Chief Complaint:: Foot Pain (Right foot pain due to hallux valgus)    ANTOINETTE Elaine is a 56 y.o. female who presents with a complaint of right foot pain due to hallux valgus lasting for years.Has been previously evaluated in 2016. Onset of symptoms bone protrusion base of right great toe and pain and reports no trauma.  Current symptoms include aching to sharp and throbbing pain, in metatarsal pad, bone protrusion base of right great toe.  Aggravating factors are walking and weightbearing. Symptoms have progressed over time. Treatment to date have included tylenol, aleve, and x-ray preformed 1/10/2022.    Vitals:    01/12/22 1449   BP: 114/68   Pulse: 82   Resp: 18   SpO2: 99%   Weight: 63.5 kg (140 lb)   Height: 5' 3" (1.6 m)   PainSc:   6      Shoe Size: 8    Past Surgical History:   Procedure Laterality Date    CHOLECYSTECTOMY  2002    TUBAL LIGATION  04/10/1991     Past Medical History:   Diagnosis Date    Chronic back pain     right side radiculopathy, pain contract 11/21/2011     Family History   Problem Relation Age of Onset    Alzheimer's disease Mother         Social History:   Marital Status:   Alcohol History:  reports current alcohol use.  Tobacco History:  reports that she has never smoked. She has never used smokeless tobacco.  Drug History:  reports no history of drug use.    Review of patient's allergies indicates:   Allergen Reactions    No known drug allergies        Current Outpatient Medications   Medication Sig Dispense Refill    albuterol (PROVENTIL/VENTOLIN HFA) 90 mcg/actuation inhaler Inhale 2 puffs into the lungs 4 (four) times daily. 3 Inhaler 1     No current facility-administered medications for this visit.       Review of Systems   Constitutional: Negative for chills, fatigue, fever " and unexpected weight change.   HENT: Negative for hearing loss and trouble swallowing.    Eyes: Negative for photophobia and visual disturbance.   Respiratory: Negative for cough, shortness of breath and wheezing.    Cardiovascular: Negative for chest pain, palpitations and leg swelling.   Gastrointestinal: Negative for abdominal pain and nausea.   Genitourinary: Negative for dysuria and frequency.   Musculoskeletal: Positive for myalgias. Negative for arthralgias, back pain, gait problem and joint swelling.   Skin: Negative for rash and wound.   Neurological: Negative for tremors, seizures, weakness, numbness and headaches.   Hematological: Does not bruise/bleed easily.         Objective:        Physical Exam:   Foot Exam    General  General Appearance: appears stated age and healthy   Orientation: alert and oriented to person, place, and time   Affect: appropriate   Gait: antalgic       Right Foot/Ankle     Inspection and Palpation  Ecchymosis: none  Tenderness: great toe metatarsophalangeal joint   Swelling: great toe metatarsophalangeal joint   Arch: normal  Hallux valgus: yes  Hallux limitus: yes  Skin Exam: skin intact;   Neurovascular  Dorsalis pedis: 2+  Posterior tibial: 2+  Capillary Refill: 2+  Varicose veins: not present  Saphenous nerve sensation: normal  Tibial nerve sensation: normal  Superficial peroneal nerve sensation: normal  Deep peroneal nerve sensation: normal  Sural nerve sensation: normal    Edema  Type of edema: non-pitting    Muscle Strength  Ankle dorsiflexion: 5  Ankle plantar flexion: 5  Ankle inversion: 5  Ankle eversion: 5  Great toe extension: 5  Great toe flexion: 5    Range of Motion    Normal right ankle ROM  Passive  1st MTP extension: pain  1st MTP flexion: pain    Active  1st MTP extension: pain  1st MTP flexion: pain    Tests  Anterior drawer: negative   Talar tilt: negative   PT Tinel's sign: negative    Paresthesia: negative  Comments  Mild bunion deformity is present.   Great toe is laterally deviated.  Great toe is not track bound.  First MPJ range of motion is tender and slightly limited, but without crepitus.        Physical Exam  Cardiovascular:      Pulses:           Dorsalis pedis pulses are 2+ on the right side.        Posterior tibial pulses are 2+ on the right side.   Musculoskeletal:      Right foot: Bunion present.         Imaging: X-Ray Foot Complete Right  Narrative: EXAMINATION:  XR FOOT COMPLETE 3 VIEW RIGHT    CLINICAL HISTORY:  . Bunion of right foot    TECHNIQUE:  AP, lateral, and oblique views of the right foot were performed.    COMPARISON:  None    FINDINGS:  There is mild hallux valgus and early bunion formation.  A fracture is not identified.  Juxta-articular bone erosion or Osteoporosis is not seen.  Impression: Early bunion formation with hallux valgus.    Electronically signed by: Gage Mcpherson MD  Date:    01/10/2022  Time:    16:36    Increased 1st and 2nd intermetatarsal angle of 13°.  There is also mild degenerative changes of the 1st metatarsophalangeal joint.           Assessment:       1. Hallux valgus of right foot    2. Hallux limitus of right foot    3. Bunion of right foot    4. Right foot pain      Plan:   Hallux valgus of right foot    Hallux limitus of right foot    Bunion of right foot  -     Ambulatory referral/consult to Podiatry    Right foot pain      Follow up Patient will be scheduled for outpatient surgery.    Procedures        We discussed different surgical and conservative treatment options for the patient's right foot bunion.  At this time patient wishes to proceed with surgical intervention.    Patient was educated about the entire pre, dahiana and post-operative time period.  They  were educated about the pro's and con's of surgery.  All conservative measures have been exhausted. Patient understands the particular risks involved which may occur in connection with the procedure proposed including pain, swelling, infection,  stiffness, decreased ROM, recurrence, rejection, numbness, delayed healing, scar formation.    Patient was educated that their diagnosis may be surgically treated.  The patient's problem will probably advance and usually will not get better without surgery.  All of the pre-operative treatment plans have been exhausted or will no longer be successful at this point in time.  Patient was told of the possible outcomes and expectations of the surgical procedure.  They  will need to be followed-up post-operatively.  Today, pictures were drawn, questions answered.      We discussed the following surgical procedures:  Bunionectomy with osteotomy/shortening osteotomy right foot       Counseling:     I provided patient education verbally regarding:   Patient diagnosis, treatment options, as well as alternatives, risks, and benefits.     I provided patient education regarding: Bunion deformity and causes. I discussed conservative treatment with shoe modification, pads, treating symptoms with OTC NSAIDs, pads between toes, inserts and pads over the bunion. I explained that conservative treatment is non-curative but may help with pain and slow down the progression of the deformity.     I explained to the pt the 4 stages of osteoarthritic changes of the 1st MPJ starting with functional loss of range of motion and eventual destruction of the cartilage causing increased deformity, pain and loss of motion.  I discuss the treatment of the early stages 1 and 2 with shoe modification, NSAIDs, possible cortisone shots and CMOs.  I told her most stage 3 and 4 if they become painful enough may require surgical intervention.  I discussed joint preservation procedure with partial relief of pain and some increase in function and possible second surgery in the future if pain and arthritis become worse.  I discussed joint destruction procedures of fusion o 1st MPJ, Broussard bunionectomy and possible joint implant if the pt. meets my criteria of  older age and sedentary life style.      This note was created using Dragon voice recognition software that occasionally misinterpreted phrases or words.

## 2022-01-12 NOTE — TELEPHONE ENCOUNTER
Returned call to pt regarding getting her a return to work letter, no answer. Left Voicemail for pt to return call regarding letter.

## 2022-01-24 LAB
HUMAN PAPILLOMAVIRUS (HPV): POSITIVE
PAP RECOMMENDATION EXT: NORMAL

## 2022-01-26 ENCOUNTER — HOSPITAL ENCOUNTER (OUTPATIENT)
Dept: RADIOLOGY | Facility: HOSPITAL | Age: 57
Discharge: HOME | End: 2022-01-26
Attending: SPECIALIST
Payer: COMMERCIAL

## 2022-01-26 VITALS — WEIGHT: 140 LBS | HEIGHT: 63 IN | BODY MASS INDEX: 24.8 KG/M2

## 2022-01-26 DIAGNOSIS — Z12.31 ENCOUNTER FOR SCREENING MAMMOGRAM FOR MALIGNANT NEOPLASM OF BREAST: ICD-10-CM

## 2022-01-26 PROCEDURE — 77067 SCR MAMMO BI INCL CAD: CPT | Mod: TC,PO

## 2022-01-26 PROCEDURE — 77063 BREAST TOMOSYNTHESIS BI: CPT | Mod: TC,PO

## 2022-02-22 ENCOUNTER — HOSPITAL ENCOUNTER (OUTPATIENT)
Dept: RADIOLOGY | Facility: HOSPITAL | Age: 57
Discharge: HOME | End: 2022-02-22
Attending: SPECIALIST
Payer: COMMERCIAL

## 2022-02-22 DIAGNOSIS — R92.2 INCONCLUSIVE MAMMOGRAM: ICD-10-CM

## 2022-02-22 PROCEDURE — 76642 ULTRASOUND BREAST LIMITED: CPT | Mod: TC,PO,LT

## 2022-02-22 PROCEDURE — 77065 DX MAMMO INCL CAD UNI: CPT | Mod: TC,PO,LT

## 2022-09-07 ENCOUNTER — TELEPHONE (OUTPATIENT)
Dept: FAMILY MEDICINE | Facility: CLINIC | Age: 57
End: 2022-09-07

## 2022-09-07 ENCOUNTER — OFFICE VISIT (OUTPATIENT)
Dept: FAMILY MEDICINE | Facility: CLINIC | Age: 57
End: 2022-09-07
Payer: COMMERCIAL

## 2022-09-07 VITALS
DIASTOLIC BLOOD PRESSURE: 72 MMHG | OXYGEN SATURATION: 99 % | BODY MASS INDEX: 25.52 KG/M2 | WEIGHT: 144 LBS | SYSTOLIC BLOOD PRESSURE: 116 MMHG | HEIGHT: 63 IN | RESPIRATION RATE: 17 BRPM | TEMPERATURE: 98 F | HEART RATE: 70 BPM

## 2022-09-07 DIAGNOSIS — H10.021 PINK EYE DISEASE OF RIGHT EYE: Primary | ICD-10-CM

## 2022-09-07 PROCEDURE — 3078F DIAST BP <80 MM HG: CPT | Mod: S$GLB,,, | Performed by: INTERNAL MEDICINE

## 2022-09-07 PROCEDURE — 99212 PR OFFICE/OUTPT VISIT, EST, LEVL II, 10-19 MIN: ICD-10-PCS | Mod: S$GLB,,, | Performed by: INTERNAL MEDICINE

## 2022-09-07 PROCEDURE — 3008F BODY MASS INDEX DOCD: CPT | Mod: S$GLB,,, | Performed by: INTERNAL MEDICINE

## 2022-09-07 PROCEDURE — 99212 OFFICE O/P EST SF 10 MIN: CPT | Mod: S$GLB,,, | Performed by: INTERNAL MEDICINE

## 2022-09-07 PROCEDURE — 1159F PR MEDICATION LIST DOCUMENTED IN MEDICAL RECORD: ICD-10-PCS | Mod: S$GLB,,, | Performed by: INTERNAL MEDICINE

## 2022-09-07 PROCEDURE — 3074F SYST BP LT 130 MM HG: CPT | Mod: S$GLB,,, | Performed by: INTERNAL MEDICINE

## 2022-09-07 PROCEDURE — 1159F MED LIST DOCD IN RCRD: CPT | Mod: S$GLB,,, | Performed by: INTERNAL MEDICINE

## 2022-09-07 PROCEDURE — 3078F PR MOST RECENT DIASTOLIC BLOOD PRESSURE < 80 MM HG: ICD-10-PCS | Mod: S$GLB,,, | Performed by: INTERNAL MEDICINE

## 2022-09-07 PROCEDURE — 3008F PR BODY MASS INDEX (BMI) DOCUMENTED: ICD-10-PCS | Mod: S$GLB,,, | Performed by: INTERNAL MEDICINE

## 2022-09-07 PROCEDURE — 3074F PR MOST RECENT SYSTOLIC BLOOD PRESSURE < 130 MM HG: ICD-10-PCS | Mod: S$GLB,,, | Performed by: INTERNAL MEDICINE

## 2022-09-07 RX ORDER — TOBRAMYCIN AND DEXAMETHASONE 3; 1 MG/ML; MG/ML
1 SUSPENSION/ DROPS OPHTHALMIC
Qty: 2.5 ML | Refills: 0 | Status: SHIPPED | OUTPATIENT
Start: 2022-09-07 | End: 2022-09-12

## 2022-09-07 NOTE — PROGRESS NOTES
Subjective:       Patient ID: Candie Elaine is a 56 y.o. female.    Chief Complaint: eye irritation (Pt reports waking up yesterday morning with pink eye in (right eye). Pt states she will also need a return to work note. /)    Patient presents today because of irritation and photophobia on the right eye.  This has resolved since yesterday.  The eye was matted upon waking up.  She has no symptoms of 4 on the left side.  She has not been able to wear her contacts and should not do so until this clears up.  He is okay for her to watch her eyes with baby shampoo she so desires to be able to get the night of secretions restart.  No foreign body sensation is described.    Patient takes no medications at home prescribed by a physician.  She is up-to-date with her wellness examination.      Will not be addressed at this time.      She is not having fever chills nausea vomiting diarrhea.  There is no symptomatology to suggest COVID.            Review of Systems   All other systems reviewed and are negative.      Objective:      Physical Exam  Vitals and nursing note reviewed.   Constitutional:       General: She is not in acute distress.     Appearance: Normal appearance. She is normal weight. She is not ill-appearing, toxic-appearing or diaphoretic.   HENT:      Head: Normocephalic and atraumatic.   Cardiovascular:      Rate and Rhythm: Normal rate and regular rhythm.      Pulses: Normal pulses.      Heart sounds: Normal heart sounds. No murmur heard.  Pulmonary:      Effort: Pulmonary effort is normal.      Breath sounds: Normal breath sounds.   Musculoskeletal:      Right lower leg: No edema.      Left lower leg: No edema.   Skin:     General: Skin is warm and dry.      Capillary Refill: Capillary refill takes less than 2 seconds.      Coloration: Skin is not jaundiced or pale.   Neurological:      General: No focal deficit present.      Mental Status: She is alert and oriented to person, place, and time. Mental status  is at baseline.      Cranial Nerves: No cranial nerve deficit.      Sensory: No sensory deficit.      Motor: No weakness.      Coordination: Coordination normal.      Gait: Gait normal.   Psychiatric:         Mood and Affect: Mood normal.         Behavior: Behavior normal.         Thought Content: Thought content normal.         Judgment: Judgment normal.       Assessment:       Problem List Items Addressed This Visit    None  Visit Diagnoses       Pink eye disease of right eye    -  Primary              Plan:       Patient will be treated empirically with TobraDex 1 drop to each eye every 4 hours while awake.  I have recommended for her to place the drops in the refrigerator so that will be more so than when she is still some into her eyes.  She can use baby shampoo to wash her eyes.  At this point no other intervention is considered necessary.  If she starts experiencing significant pain and worsening of the photophobia then she should be seen by an eye doctor.    Pt is well aware of the signs and symptoms to watch for and to seek medical attention in case these appear

## 2022-12-19 ENCOUNTER — OFFICE VISIT (OUTPATIENT)
Dept: FAMILY MEDICINE | Facility: CLINIC | Age: 57
End: 2022-12-19
Payer: COMMERCIAL

## 2022-12-19 VITALS
OXYGEN SATURATION: 99 % | BODY MASS INDEX: 26.4 KG/M2 | TEMPERATURE: 98 F | DIASTOLIC BLOOD PRESSURE: 64 MMHG | WEIGHT: 149 LBS | RESPIRATION RATE: 18 BRPM | HEIGHT: 63 IN | HEART RATE: 83 BPM | SYSTOLIC BLOOD PRESSURE: 108 MMHG

## 2022-12-19 DIAGNOSIS — D64.9 ANEMIA, UNSPECIFIED TYPE: ICD-10-CM

## 2022-12-19 DIAGNOSIS — E03.9 HYPOTHYROIDISM, UNSPECIFIED TYPE: ICD-10-CM

## 2022-12-19 DIAGNOSIS — Z11.59 NEED FOR HEPATITIS C SCREENING TEST: ICD-10-CM

## 2022-12-19 DIAGNOSIS — E78.00 HYPERCHOLESTEROLEMIA: ICD-10-CM

## 2022-12-19 DIAGNOSIS — J31.0 RHINITIS, UNSPECIFIED TYPE: Primary | ICD-10-CM

## 2022-12-19 DIAGNOSIS — J45.909 ASTHMA DUE TO ENVIRONMENTAL ALLERGIES: ICD-10-CM

## 2022-12-19 DIAGNOSIS — Z79.899 ENCOUNTER FOR LONG-TERM (CURRENT) USE OF OTHER MEDICATIONS: ICD-10-CM

## 2022-12-19 LAB
ALBUMIN SERPL BCP-MCNC: 3.8 G/DL (ref 3.5–5.2)
ALP SERPL-CCNC: 79 U/L (ref 55–135)
ALT SERPL W/O P-5'-P-CCNC: 18 U/L (ref 10–44)
ANION GAP SERPL CALC-SCNC: 7 MMOL/L (ref 8–16)
AST SERPL-CCNC: 18 U/L (ref 10–40)
BASOPHILS # BLD AUTO: 0.02 K/UL (ref 0–0.2)
BASOPHILS NFR BLD: 0.5 % (ref 0–1.9)
BILIRUB SERPL-MCNC: 0.4 MG/DL (ref 0.1–1)
BUN SERPL-MCNC: 13 MG/DL (ref 6–20)
CALCIUM SERPL-MCNC: 9.8 MG/DL (ref 8.7–10.5)
CHLORIDE SERPL-SCNC: 104 MMOL/L (ref 95–110)
CHOLEST SERPL-MCNC: 165 MG/DL (ref 120–199)
CHOLEST/HDLC SERPL: 2.8 {RATIO} (ref 2–5)
CO2 SERPL-SCNC: 27 MMOL/L (ref 23–29)
CREAT SERPL-MCNC: 0.8 MG/DL (ref 0.5–1.4)
DIFFERENTIAL METHOD: ABNORMAL
EOSINOPHIL # BLD AUTO: 0.1 K/UL (ref 0–0.5)
EOSINOPHIL NFR BLD: 2.9 % (ref 0–8)
ERYTHROCYTE [DISTWIDTH] IN BLOOD BY AUTOMATED COUNT: 11.6 % (ref 11.5–14.5)
EST. GFR  (NO RACE VARIABLE): >60 ML/MIN/1.73 M^2
GLUCOSE SERPL-MCNC: 95 MG/DL (ref 70–110)
HCT VFR BLD AUTO: 39.2 % (ref 37–48.5)
HDLC SERPL-MCNC: 60 MG/DL (ref 40–75)
HDLC SERPL: 36.4 % (ref 20–50)
HGB BLD-MCNC: 13.4 G/DL (ref 12–16)
IMM GRANULOCYTES # BLD AUTO: 0.01 K/UL (ref 0–0.04)
IMM GRANULOCYTES NFR BLD AUTO: 0.3 % (ref 0–0.5)
LDLC SERPL CALC-MCNC: 82 MG/DL (ref 63–159)
LYMPHOCYTES # BLD AUTO: 0.8 K/UL (ref 1–4.8)
LYMPHOCYTES NFR BLD: 21.1 % (ref 18–48)
MCH RBC QN AUTO: 30.9 PG (ref 27–31)
MCHC RBC AUTO-ENTMCNC: 34.2 G/DL (ref 32–36)
MCV RBC AUTO: 91 FL (ref 82–98)
MONOCYTES # BLD AUTO: 0.4 K/UL (ref 0.3–1)
MONOCYTES NFR BLD: 9.9 % (ref 4–15)
NEUTROPHILS # BLD AUTO: 2.5 K/UL (ref 1.8–7.7)
NEUTROPHILS NFR BLD: 65.3 % (ref 38–73)
NONHDLC SERPL-MCNC: 105 MG/DL
NRBC BLD-RTO: 0 /100 WBC
PLATELET # BLD AUTO: 156 K/UL (ref 150–450)
PMV BLD AUTO: 12.8 FL (ref 9.2–12.9)
POTASSIUM SERPL-SCNC: 4.3 MMOL/L (ref 3.5–5.1)
PROT SERPL-MCNC: 7.5 G/DL (ref 6–8.4)
RBC # BLD AUTO: 4.33 M/UL (ref 4–5.4)
SODIUM SERPL-SCNC: 138 MMOL/L (ref 136–145)
TRIGL SERPL-MCNC: 115 MG/DL (ref 30–150)
TSH SERPL DL<=0.005 MIU/L-ACNC: 1.45 UIU/ML (ref 0.4–4)
WBC # BLD AUTO: 3.83 K/UL (ref 3.9–12.7)

## 2022-12-19 PROCEDURE — 3008F BODY MASS INDEX DOCD: CPT | Mod: S$GLB,,, | Performed by: INTERNAL MEDICINE

## 2022-12-19 PROCEDURE — 96372 THER/PROPH/DIAG INJ SC/IM: CPT | Mod: S$GLB,,, | Performed by: INTERNAL MEDICINE

## 2022-12-19 PROCEDURE — 80053 COMPREHEN METABOLIC PANEL: CPT | Performed by: INTERNAL MEDICINE

## 2022-12-19 PROCEDURE — 3074F SYST BP LT 130 MM HG: CPT | Mod: S$GLB,,, | Performed by: INTERNAL MEDICINE

## 2022-12-19 PROCEDURE — 1159F MED LIST DOCD IN RCRD: CPT | Mod: S$GLB,,, | Performed by: INTERNAL MEDICINE

## 2022-12-19 PROCEDURE — 3008F PR BODY MASS INDEX (BMI) DOCUMENTED: ICD-10-PCS | Mod: S$GLB,,, | Performed by: INTERNAL MEDICINE

## 2022-12-19 PROCEDURE — 80061 LIPID PANEL: CPT | Performed by: INTERNAL MEDICINE

## 2022-12-19 PROCEDURE — 85025 COMPLETE CBC W/AUTO DIFF WBC: CPT | Performed by: INTERNAL MEDICINE

## 2022-12-19 PROCEDURE — 3074F PR MOST RECENT SYSTOLIC BLOOD PRESSURE < 130 MM HG: ICD-10-PCS | Mod: S$GLB,,, | Performed by: INTERNAL MEDICINE

## 2022-12-19 PROCEDURE — 3078F DIAST BP <80 MM HG: CPT | Mod: S$GLB,,, | Performed by: INTERNAL MEDICINE

## 2022-12-19 PROCEDURE — 84443 ASSAY THYROID STIM HORMONE: CPT | Performed by: INTERNAL MEDICINE

## 2022-12-19 PROCEDURE — 86803 HEPATITIS C AB TEST: CPT | Performed by: INTERNAL MEDICINE

## 2022-12-19 PROCEDURE — 96372 PR INJECTION,THERAP/PROPH/DIAG2ST, IM OR SUBCUT: ICD-10-PCS | Mod: S$GLB,,, | Performed by: INTERNAL MEDICINE

## 2022-12-19 PROCEDURE — 1159F PR MEDICATION LIST DOCUMENTED IN MEDICAL RECORD: ICD-10-PCS | Mod: S$GLB,,, | Performed by: INTERNAL MEDICINE

## 2022-12-19 PROCEDURE — 99213 OFFICE O/P EST LOW 20 MIN: CPT | Mod: 25,S$GLB,, | Performed by: INTERNAL MEDICINE

## 2022-12-19 PROCEDURE — 99213 PR OFFICE/OUTPT VISIT, EST, LEVL III, 20-29 MIN: ICD-10-PCS | Mod: 25,S$GLB,, | Performed by: INTERNAL MEDICINE

## 2022-12-19 PROCEDURE — 3078F PR MOST RECENT DIASTOLIC BLOOD PRESSURE < 80 MM HG: ICD-10-PCS | Mod: S$GLB,,, | Performed by: INTERNAL MEDICINE

## 2022-12-19 RX ORDER — FLUTICASONE PROPIONATE 50 MCG
2 SPRAY, SUSPENSION (ML) NASAL DAILY
Qty: 18.2 ML | Refills: 1 | Status: SHIPPED | OUTPATIENT
Start: 2022-12-19

## 2022-12-19 RX ORDER — LORATADINE 10 MG/1
10 TABLET ORAL DAILY
Qty: 30 TABLET | Refills: 3 | Status: SHIPPED | OUTPATIENT
Start: 2022-12-19 | End: 2023-04-20

## 2022-12-19 RX ORDER — DEXAMETHASONE SODIUM PHOSPHATE 4 MG/ML
4 INJECTION, SOLUTION INTRA-ARTICULAR; INTRALESIONAL; INTRAMUSCULAR; INTRAVENOUS; SOFT TISSUE
Status: COMPLETED | OUTPATIENT
Start: 2022-12-19 | End: 2022-12-19

## 2022-12-19 RX ADMIN — DEXAMETHASONE SODIUM PHOSPHATE 4 MG: 4 INJECTION, SOLUTION INTRA-ARTICULAR; INTRALESIONAL; INTRAMUSCULAR; INTRAVENOUS; SOFT TISSUE at 09:12

## 2022-12-19 NOTE — Clinical Note
Patient is up-to-date with her gynecological screenings by Dr. Garfield Brown including cervical cancer screening and mammography..

## 2022-12-19 NOTE — PROGRESS NOTES
Subjective:       Patient ID: Candie Elaine is a 57 y.o. female.    Chief Complaint: Follow-up (Pt states over the weekend she has been feeling fatigued, body aches and cough. PT states she has been taking seduafed and benadryl ) and Cough    HPI  Review of Systems   All other systems reviewed and are negative.      Objective:      Physical Exam    Assessment:       Problem List Items Addressed This Visit          Pulmonary    Asthma due to environmental allergies     Other Visit Diagnoses       Rhinitis, unspecified type    -  Primary              Plan:       Test

## 2022-12-19 NOTE — PROGRESS NOTES
Subjective:       Patient ID: Candie Elaine is a 57 y.o. female.    Chief Complaint: Follow-up (Pt states over the weekend she has been feeling fatigued, body aches and cough. PT states she has been taking seduafed and benadryl ) and Cough    Patient is not having a fever at this time.  No nausea vomiting diarrhea or any other constitutional symptoms.  She does not have loss of sense of taste or smell.    Patient says yesterday she did have some generalized aching that is not present today.      She is never taken a flu shot and is not interested in same.      On further review of the chart she has not had a wellness examination a very long time and is in agreement with having the lab work drawn here today so she can have same in the next 14 days performed by me.      Regarding other care gaps she is up-to-date with mammography and Pap smears being that she is been seeing Dr. Garfield Brown for over 20 years.  I will get the clinical care coordinator to get that information updated into the care gaps.  The rest of the will be addressed at the time of the wellness exam.    There is no wheezing the patient has a history of asthma she does have her albuterol inhaler available at home.  Patient does not appear acutely ill.    He does have some fullness on the left ear without dizziness or pain.  No over the counter medications have been tried.    Review of Systems   All other systems reviewed and are negative.      Objective:      Physical Exam  Vitals and nursing note reviewed.   Constitutional:       General: She is not in acute distress.     Appearance: Normal appearance. She is normal weight. She is not ill-appearing, toxic-appearing or diaphoretic.   HENT:      Head: Normocephalic and atraumatic.      Right Ear: Tympanic membrane, ear canal and external ear normal. There is no impacted cerumen.      Left Ear: Tympanic membrane, ear canal and external ear normal. There is no impacted cerumen.   Eyes:       General: No scleral icterus.     Extraocular Movements: Extraocular movements intact.      Conjunctiva/sclera: Conjunctivae normal.      Pupils: Pupils are equal, round, and reactive to light.   Cardiovascular:      Rate and Rhythm: Normal rate and regular rhythm.      Pulses: Normal pulses.      Heart sounds: Normal heart sounds. No murmur heard.  Skin:     General: Skin is warm and dry.      Coloration: Skin is not jaundiced or pale.   Neurological:      General: No focal deficit present.      Mental Status: She is alert and oriented to person, place, and time. Mental status is at baseline.      Cranial Nerves: No cranial nerve deficit.      Sensory: No sensory deficit.      Motor: No weakness.      Coordination: Coordination normal.      Gait: Gait normal.   Psychiatric:         Mood and Affect: Mood normal.         Behavior: Behavior normal.         Thought Content: Thought content normal.         Judgment: Judgment normal.       Assessment:       Problem List Items Addressed This Visit          Pulmonary    Asthma due to environmental allergies     Other Visit Diagnoses       Rhinitis, unspecified type    -  Primary    Relevant Medications    fluticasone propionate (FLONASE) 50 mcg/actuation nasal spray    loratadine (CLARITIN) 10 mg tablet    dexAMETHasone injection 4 mg (Completed)    Need for hepatitis C screening test        Relevant Orders    Hepatitis C Antibody    Anemia, unspecified type        Relevant Orders    CBC Auto Differential    Encounter for long-term (current) use of other medications        Relevant Orders    Comprehensive Metabolic Panel    Hypercholesterolemia        Relevant Orders    Lipid Panel    Hypothyroidism, unspecified type        Relevant Orders    TSH              Plan:       Patient appears to be experiencing more of a rhinitis symptomatology so he will be treated accordingly with the use of Claritin 10 mg every day, Flonase 2 sprays each nostril once a day and N acetylcysteine  600 mg 1 twice a day as an expectorant.  She can use Tylenol for the aches and pains and fever.  No testing for hours B, flu or COVID is warranted at this time.  No chest x-ray is warranted.  Patient is not experiencing any asthma symptomatology but she does have her inhaler available in case this happens.      Wellness examination lab work has been drawn here today including a CBC, comprehensive metabolic panel, hepatitis-C screening, TSH and lipids in the report will be available by phone this afternoon and if anything is grossly abnormal she will be contacted otherwise patient will be scheduled for wellness examination by me in the next 14 days.    Pt is well aware of the signs and symptoms to watch for and to seek medical attention in case these appear

## 2022-12-20 LAB — HCV AB SERPL QL IA: NORMAL

## 2022-12-21 ENCOUNTER — PATIENT OUTREACH (OUTPATIENT)
Dept: ADMINISTRATIVE | Facility: HOSPITAL | Age: 57
End: 2022-12-21
Payer: COMMERCIAL

## 2022-12-21 ENCOUNTER — PATIENT MESSAGE (OUTPATIENT)
Dept: ADMINISTRATIVE | Facility: HOSPITAL | Age: 57
End: 2022-12-21
Payer: COMMERCIAL

## 2023-02-20 DIAGNOSIS — Z12.31 SCREENING MAMMOGRAM FOR HIGH-RISK PATIENT: Primary | ICD-10-CM

## 2023-02-27 ENCOUNTER — HOSPITAL ENCOUNTER (OUTPATIENT)
Dept: RADIOLOGY | Facility: HOSPITAL | Age: 58
Discharge: HOME | End: 2023-02-27
Attending: SPECIALIST
Payer: COMMERCIAL

## 2023-02-27 DIAGNOSIS — Z12.31 SCREENING MAMMOGRAM FOR HIGH-RISK PATIENT: ICD-10-CM

## 2023-02-27 PROCEDURE — 77067 SCR MAMMO BI INCL CAD: CPT | Mod: TC,PO

## 2023-03-24 ENCOUNTER — PATIENT OUTREACH (OUTPATIENT)
Dept: ADMINISTRATIVE | Facility: HOSPITAL | Age: 58
End: 2023-03-24
Payer: COMMERCIAL

## 2023-04-10 ENCOUNTER — PATIENT OUTREACH (OUTPATIENT)
Dept: ADMINISTRATIVE | Facility: HOSPITAL | Age: 58
End: 2023-04-10
Payer: COMMERCIAL

## 2023-04-12 ENCOUNTER — PATIENT MESSAGE (OUTPATIENT)
Dept: ADMINISTRATIVE | Facility: HOSPITAL | Age: 58
End: 2023-04-12
Payer: COMMERCIAL

## 2023-04-20 ENCOUNTER — OFFICE VISIT (OUTPATIENT)
Dept: FAMILY MEDICINE | Facility: CLINIC | Age: 58
End: 2023-04-20
Payer: COMMERCIAL

## 2023-04-20 VITALS
WEIGHT: 155.88 LBS | BODY MASS INDEX: 27.62 KG/M2 | OXYGEN SATURATION: 97 % | TEMPERATURE: 98 F | SYSTOLIC BLOOD PRESSURE: 122 MMHG | DIASTOLIC BLOOD PRESSURE: 70 MMHG | HEIGHT: 63 IN | HEART RATE: 89 BPM

## 2023-04-20 DIAGNOSIS — H61.21 IMPACTED CERUMEN OF RIGHT EAR: Primary | ICD-10-CM

## 2023-04-20 DIAGNOSIS — B00.1 FEVER BLISTER: ICD-10-CM

## 2023-04-20 PROCEDURE — 99211 OFF/OP EST MAY X REQ PHY/QHP: CPT | Mod: S$GLB,,, | Performed by: INTERNAL MEDICINE

## 2023-04-20 PROCEDURE — 3008F BODY MASS INDEX DOCD: CPT | Mod: S$GLB,,, | Performed by: INTERNAL MEDICINE

## 2023-04-20 PROCEDURE — 1159F PR MEDICATION LIST DOCUMENTED IN MEDICAL RECORD: ICD-10-PCS | Mod: S$GLB,,, | Performed by: INTERNAL MEDICINE

## 2023-04-20 PROCEDURE — 3074F SYST BP LT 130 MM HG: CPT | Mod: S$GLB,,, | Performed by: INTERNAL MEDICINE

## 2023-04-20 PROCEDURE — 1159F MED LIST DOCD IN RCRD: CPT | Mod: S$GLB,,, | Performed by: INTERNAL MEDICINE

## 2023-04-20 PROCEDURE — 3078F DIAST BP <80 MM HG: CPT | Mod: S$GLB,,, | Performed by: INTERNAL MEDICINE

## 2023-04-20 PROCEDURE — 3008F PR BODY MASS INDEX (BMI) DOCUMENTED: ICD-10-PCS | Mod: S$GLB,,, | Performed by: INTERNAL MEDICINE

## 2023-04-20 PROCEDURE — 99211 PR OFFICE/OUTPT VISIT, EST, LEVL I: ICD-10-PCS | Mod: S$GLB,,, | Performed by: INTERNAL MEDICINE

## 2023-04-20 PROCEDURE — 3074F PR MOST RECENT SYSTOLIC BLOOD PRESSURE < 130 MM HG: ICD-10-PCS | Mod: S$GLB,,, | Performed by: INTERNAL MEDICINE

## 2023-04-20 PROCEDURE — 3078F PR MOST RECENT DIASTOLIC BLOOD PRESSURE < 80 MM HG: ICD-10-PCS | Mod: S$GLB,,, | Performed by: INTERNAL MEDICINE

## 2023-04-20 NOTE — PROGRESS NOTES
Subjective     Patient ID: Candie Elaine is a 57 y.o. female.    Chief Complaint: Otalgia (right)    Presents with complaints of right ear discomfort.  This is not related to trauma.  There is no ear drainage.  She feels like the ear is full.  She can also hear it popping there is no redness of the outer ear.  No fever chills nausea vomiting diarrhea or any other constitutional symptoms.    Patient's uses acyclovir 5% ointment for fever blisters because she can not tolerate the acyclovir orally due to severe nausea.  There is no need for refills today.  Other medications do not need refills either.    Review of Systems   All other systems reviewed and are negative.       Objective     Physical Exam  Vitals and nursing note reviewed.   Constitutional:       General: She is not in acute distress.     Appearance: Normal appearance. She is normal weight. She is not ill-appearing, toxic-appearing or diaphoretic.   HENT:      Head: Normocephalic and atraumatic.      Right Ear: External ear normal. There is impacted cerumen.      Left Ear: Tympanic membrane, ear canal and external ear normal. There is no impacted cerumen.   Neurological:      Mental Status: She is alert.          Assessment and Plan     Problem List Items Addressed This Visit    None  Visit Diagnoses       Impacted cerumen of right ear    -  Primary    Fever blister                By examination patient has a impacted cerumen at the mid canal that she should be able to clear easily with just allowing the soap or shampoo to get into her ear canal and leaving the shower head water hit it.  He opted against ear lavage here because she has a tendency towards vertigo and she still has to drive herself home.  If this symptoms are not improved by next week she is welcome to call back      Patient is to check at home regards to the supply that she has for the acyclovir 5% ointment being that we are about to get into the summer months and she usually has  significant flare ups of her fever blisters during the time of the year.  She can not tolerate the oral acyclovir because of severe nausea from same.  Instead she uses the ointment as mentioned.  She is to call me back with request for refills as needed.

## 2023-05-02 ENCOUNTER — TELEPHONE (OUTPATIENT)
Dept: FAMILY MEDICINE | Facility: CLINIC | Age: 58
End: 2023-05-02
Payer: COMMERCIAL

## 2023-05-02 DIAGNOSIS — H10.33 ACUTE CONJUNCTIVITIS OF BOTH EYES, UNSPECIFIED ACUTE CONJUNCTIVITIS TYPE: Primary | ICD-10-CM

## 2023-05-02 RX ORDER — TOBRAMYCIN AND DEXAMETHASONE 3; 1 MG/ML; MG/ML
2 SUSPENSION/ DROPS OPHTHALMIC EVERY 6 HOURS
Qty: 2.5 ML | Refills: 0 | Status: SHIPPED | OUTPATIENT
Start: 2023-05-02

## 2023-05-02 NOTE — TELEPHONE ENCOUNTER
----- Message from Nelly Decker sent at 5/2/2023  8:57 AM CDT -----  Contact: pt  Type: Needs Medical Advice      Who Called:pt  Best Call Back Number:[phone]  Additional Information: Requesting a call back regarding pt said that she called yesterday due to having conjunctivitis. Pt said she woke up yesterday with eye swollen and crust. Pt got it from granddaughter and she said she asked for some drops to be called in but hasn't heard back from office.       [id number] - [place], [place] - [address] AT [facility]  [address]  [place] [address]  Phone: [phone] Fax: [phone]      Please Advise- Thank you

## 2023-05-02 NOTE — TELEPHONE ENCOUNTER
Called patient to report that Dr. Lara will send prescription to [facility] to treat conjunctivitis. Patient verbalized understanding. Patient requested work note be sent through [employer] for work excuse today. Work excuse sent to patient through [employer]. Patient verbalized understanding. Instructed to call with any questions or concerns. KEM Correa

## 2023-06-15 ENCOUNTER — TELEPHONE (OUTPATIENT)
Dept: FAMILY MEDICINE | Facility: CLINIC | Age: 58
End: 2023-06-15
Payer: COMMERCIAL

## 2023-06-15 ENCOUNTER — DOCUMENTATION ONLY (OUTPATIENT)
Dept: FAMILY MEDICINE | Facility: CLINIC | Age: 58
End: 2023-06-15
Payer: COMMERCIAL

## 2023-06-15 DIAGNOSIS — B00.1 FEVER BLISTER: Primary | ICD-10-CM

## 2023-06-15 DIAGNOSIS — B00.1 FEVER BLISTER: ICD-10-CM

## 2023-06-15 RX ORDER — ACYCLOVIR 50 MG/G
OINTMENT TOPICAL
Qty: 5 G | Refills: 6 | Status: SHIPPED | OUTPATIENT
Start: 2023-06-15

## 2023-06-15 RX ORDER — ACYCLOVIR 50 MG/G
OINTMENT TOPICAL
Qty: 30 G | Refills: 3 | Status: SHIPPED | OUTPATIENT
Start: 2023-06-15 | End: 2023-06-15 | Stop reason: SDUPTHER

## 2023-06-15 NOTE — PROGRESS NOTES
[facility] [employer] [place]  Sent letter of denial for Acyclovir ointment. They will only approve for genital herpes and Aids type diagnosis. Patient to purchase Abreva OTC for fever blisters.

## 2023-06-15 NOTE — TELEPHONE ENCOUNTER
Called patient and left voicemail message stating that PA for medication requested had been denied by her insurance. Instructed that Dr. Lara recommends that she try Abreva OTC. KEM Correa

## 2023-06-15 NOTE — TELEPHONE ENCOUNTER
Nurse spoke to the patient. I have called and then faxed the PA  form for this medication today at 3:25. [facility]. I will sent PA [facility] to [facility] when it is approved.    ----- Message from Connie Brown sent at 6/15/2023  2:19 PM CDT -----  Contact: PT  Type:  Needs Medical Advice    Who Called: PT  Would the patient rather a call back or a response via [facility]? CALL   Best Call Back Number: [phone] (home)   Additional Information: AL WILL BE NEEDED FOR RX acyclovir 5% (ZOVIRAX) 5 % ointment

## 2023-06-15 NOTE — TELEPHONE ENCOUNTER
Called and spoke to patient. Patient encouraged to call pharmacy and make sure insurance information is update. Patient verbalized understanding and reports she will call pharmacy. KEM Correa

## 2023-06-15 NOTE — TELEPHONE ENCOUNTER
----- Message from Priti Reynaga sent at 6/15/2023 12:46 PM CDT -----  Contact: pt  Pt is calling Pharm is calling a Pre Authorization on prescription that was sent today   Please give pt a call back [phone]

## 2023-06-15 NOTE — TELEPHONE ENCOUNTER
Patient needs PA for generic Acyclovir 5% ointment.  [employer] [place] call and form as been faxed to us to complete. Notes will be sent also.

## 2023-06-15 NOTE — TELEPHONE ENCOUNTER
Spoke to patient regarding the denial for Acyclovir ointment by [employer]. She states she can get the medication at [facility] as Cash with the [facility] 5 grams for 34.00. Patient request a hardcopy RX to pickup Monday. Dr Lara will provide 06/19/2023.    ----- Message from Hillary Paz sent at 6/15/2023  4:54 PM CDT -----  Regarding: returning call  Contact: patient  Type:  Patient Returning Call    Who Called:  Patient  Who Left Message for Patient:  Sonia  Does the patient know what this is regarding?:  pa for medication  Best Call Back Number: [phone]      Additional Information: BEREKET Scott, I have Candie Elaine MRN: [id number] returning a call are you available?

## 2023-07-10 ENCOUNTER — TELEPHONE (OUTPATIENT)
Dept: FAMILY MEDICINE | Facility: CLINIC | Age: 58
End: 2023-07-10
Payer: COMMERCIAL

## 2023-07-10 NOTE — TELEPHONE ENCOUNTER
----- Message from Harshal Haddad sent at 7/10/2023  9:29 AM CDT -----  Type: Needs Medical Advice  Who Called:  pt  Best Call Back Number: [phone]    Additional Information: pt is calling the office today to inform the office that she is coming in to  paper rx today. She didn't realize that it was already written. Please call to advise if needed. Thanks!

## 2023-07-10 NOTE — TELEPHONE ENCOUNTER
Returned call to patient to report that prescription was ready for her to . Patient verbalized understanding and reports that she will pick it up in the morning. KEM Correa

## 2024-03-04 DIAGNOSIS — Z12.31 ENCOUNTER FOR SCREENING MAMMOGRAM FOR MALIGNANT NEOPLASM OF BREAST: Primary | ICD-10-CM

## 2024-03-26 ENCOUNTER — HOSPITAL ENCOUNTER (OUTPATIENT)
Dept: RADIOLOGY | Facility: HOSPITAL | Age: 59
Discharge: HOME | End: 2024-03-26
Attending: SPECIALIST
Payer: COMMERCIAL

## 2024-03-26 VITALS — WEIGHT: 155.88 LBS | HEIGHT: 63 IN | BODY MASS INDEX: 27.62 KG/M2

## 2024-03-26 DIAGNOSIS — Z12.31 ENCOUNTER FOR SCREENING MAMMOGRAM FOR MALIGNANT NEOPLASM OF BREAST: ICD-10-CM

## 2024-03-26 PROCEDURE — 77067 SCR MAMMO BI INCL CAD: CPT | Mod: TC,PO

## 2025-02-04 PROBLEM — N81.4 UTERINE PROLAPSE: Status: ACTIVE | Noted: 2025-02-04

## 2025-04-02 DIAGNOSIS — Z12.31 ENCOUNTER FOR SCREENING MAMMOGRAM FOR MALIGNANT NEOPLASM OF BREAST: Primary | ICD-10-CM
